# Patient Record
(demographics unavailable — no encounter records)

---

## 2025-07-21 NOTE — ASSESSMENT
[FreeTextEntry1] : Impression: Displaced fracture proximal 10th right fifth toe.  This patient underwent manipulation of the fracture followed by immobilization with noel taping.  Postmanipulation x-rays revealed improved alignment.  He will continue with full-time noel taping.  Not to walk barefoot.  He will return in 1 week with x-rays of the right fifth toe.  He has been made aware as to the potential for loss of acceptable alignment and need for more aggressive care

## 2025-07-21 NOTE — HISTORY OF PRESENT ILLNESS
[de-identified] : This 24-year-old healthy young man is seen for evaluation of the right fifth toe.  He was well until July 19 when he sustained blunt trauma walking barefoot at home.  This precipitated acute severe pain and deformity he was seen at an urgent care center x-rays revealed a fracture and he was advised orthopedic follow-up.  Prior to this no complaints past history is noncontributory

## 2025-07-21 NOTE — PHYSICAL EXAM
[de-identified] :   Review of x-rays right forefoot [facility] July 19, 2025 revealed angulated displaced fracture of the proximal phalanx fifth toe.  After manipulation of the fifth toe further x-rays performed today revealed improved alignment of the proximal phalanx fracture

## 2025-07-28 NOTE — PHYSICAL EXAM
[de-identified] : Exam the toe is in good clinical alignment minimal swelling less discomfort to palpation  X-rays were not taken today satisfactory alignment of the proximal phalanx fracture maintained

## 2025-07-28 NOTE — ASSESSMENT
[FreeTextEntry1] : Impression: Fracture proximal phalanx right fifth toe.  Continue noel taping postop shoe he is not capable of driving is yet to return in 2 weeks with x-rays of the right fifth toe

## 2025-07-28 NOTE — HISTORY OF PRESENT ILLNESS
[de-identified] : This 24-year-old returns for follow-up of his right fifth toe injury he is comfortable no complaints